# Patient Record
Sex: FEMALE | URBAN - METROPOLITAN AREA
[De-identification: names, ages, dates, MRNs, and addresses within clinical notes are randomized per-mention and may not be internally consistent; named-entity substitution may affect disease eponyms.]

---

## 2023-09-19 ENCOUNTER — INPATIENT (INPATIENT)
Facility: HOSPITAL | Age: 23
LOS: 2 days | Discharge: HOME | DRG: 754 | End: 2023-09-22
Attending: PSYCHIATRY & NEUROLOGY | Admitting: PSYCHIATRY & NEUROLOGY
Payer: MEDICAID

## 2023-09-19 DIAGNOSIS — Z91.51 PERSONAL HISTORY OF SUICIDAL BEHAVIOR: ICD-10-CM

## 2023-09-19 DIAGNOSIS — Z56.0 UNEMPLOYMENT, UNSPECIFIED: ICD-10-CM

## 2023-09-19 DIAGNOSIS — F32.9 MAJOR DEPRESSIVE DISORDER, SINGLE EPISODE, UNSPECIFIED: ICD-10-CM

## 2023-09-19 DIAGNOSIS — R45.851 SUICIDAL IDEATIONS: ICD-10-CM

## 2023-09-19 DIAGNOSIS — M54.50 LOW BACK PAIN, UNSPECIFIED: ICD-10-CM

## 2023-09-19 DIAGNOSIS — G89.21 CHRONIC PAIN DUE TO TRAUMA: ICD-10-CM

## 2023-09-19 PROCEDURE — 85025 COMPLETE CBC W/AUTO DIFF WBC: CPT

## 2023-09-19 PROCEDURE — 80053 COMPREHEN METABOLIC PANEL: CPT

## 2023-09-19 PROCEDURE — 93005 ELECTROCARDIOGRAM TRACING: CPT

## 2023-09-19 PROCEDURE — 83036 HEMOGLOBIN GLYCOSYLATED A1C: CPT

## 2023-09-19 PROCEDURE — 81025 URINE PREGNANCY TEST: CPT

## 2023-09-19 PROCEDURE — 80061 LIPID PANEL: CPT

## 2023-09-19 PROCEDURE — 80307 DRUG TEST PRSMV CHEM ANLYZR: CPT

## 2023-09-19 PROCEDURE — 84443 ASSAY THYROID STIM HORMONE: CPT

## 2023-09-19 PROCEDURE — 86769 SARS-COV-2 COVID-19 ANTIBODY: CPT

## 2023-09-19 RX ORDER — INFLUENZA VIRUS VACCINE 15; 15; 15; 15 UG/.5ML; UG/.5ML; UG/.5ML; UG/.5ML
0.5 SUSPENSION INTRAMUSCULAR ONCE
Refills: 0 | Status: DISCONTINUED | OUTPATIENT
Start: 2023-09-19 | End: 2023-09-22

## 2023-09-19 RX ORDER — DIPHENHYDRAMINE HCL 50 MG
25 CAPSULE ORAL EVERY 6 HOURS
Refills: 0 | Status: DISCONTINUED | OUTPATIENT
Start: 2023-09-20 | End: 2023-09-20

## 2023-09-19 RX ORDER — ACETAMINOPHEN 500 MG
650 TABLET ORAL EVERY 6 HOURS
Refills: 0 | Status: DISCONTINUED | OUTPATIENT
Start: 2023-09-20 | End: 2023-09-22

## 2023-09-19 RX ORDER — TRAZODONE HCL 50 MG
50 TABLET ORAL AT BEDTIME
Refills: 0 | Status: DISCONTINUED | OUTPATIENT
Start: 2023-09-20 | End: 2023-09-20

## 2023-09-19 RX ORDER — HALOPERIDOL DECANOATE 100 MG/ML
5 INJECTION INTRAMUSCULAR EVERY 6 HOURS
Refills: 0 | Status: DISCONTINUED | OUTPATIENT
Start: 2023-09-20 | End: 2023-09-20

## 2023-09-19 SDOH — ECONOMIC STABILITY - INCOME SECURITY: UNEMPLOYMENT, UNSPECIFIED: Z56.0

## 2023-09-19 NOTE — BH PATIENT PROFILE - PATIENT'S GENDER IDENTITY
Peripheral Nerve Block Patient Preparation    Peripheral Nerve Block Details  Peripheral Nerve Block Type:  Paravertebral, Ultrasound Guided  Block Designation: Right  Needle Type:  Stimulating  Stimulating Needle:  Yes    Needle Gauge:  22 G  Needle Localization: Ultrasound Guidance     Local Anesthetic:  Ropivacaine  Local Anesthetic Concentration:  0.5%  Local Anesthetic Volume:  18mL  Epinephrine:  2.5 mcg/mL (1:400K)  Incremental Injection:  Yes  Catheter Used:  No    Peripheral Nerve Block Note  T7-T8-T9 levels     No complications               Female

## 2023-09-19 NOTE — BH PATIENT PROFILE - NS ED BHA SUICIDALITY PRESENT CURRENT PASSIVE IDEATION
Felicia    Labs done with endocrinology showed normal 24 hour urine cortisol levels.  This is reassuring.    Follow up as discussed in last clinic visit.    Please call endocrinology clinic (nurse line: [phone]) if questions.    Please send a letter with above lab/test results and above comments.    Thank you.    Elisa Malcolm  
Yes

## 2023-09-20 VITALS
HEIGHT: 61 IN | WEIGHT: 150.58 LBS | DIASTOLIC BLOOD PRESSURE: 62 MMHG | HEART RATE: 66 BPM | SYSTOLIC BLOOD PRESSURE: 114 MMHG | TEMPERATURE: 97 F

## 2023-09-20 LAB — HCG UR QL: NEGATIVE — SIGNIFICANT CHANGE UP

## 2023-09-20 PROCEDURE — 93010 ELECTROCARDIOGRAM REPORT: CPT

## 2023-09-20 PROCEDURE — 99252 IP/OBS CONSLTJ NEW/EST SF 35: CPT

## 2023-09-20 PROCEDURE — 99232 SBSQ HOSP IP/OBS MODERATE 35: CPT

## 2023-09-20 PROCEDURE — 93010 ELECTROCARDIOGRAM REPORT: CPT | Mod: 77

## 2023-09-20 RX ORDER — HYDROXYZINE HCL 10 MG
50 TABLET ORAL EVERY 6 HOURS
Refills: 0 | Status: DISCONTINUED | OUTPATIENT
Start: 2023-09-20 | End: 2023-09-22

## 2023-09-20 RX ORDER — HALOPERIDOL DECANOATE 100 MG/ML
2 INJECTION INTRAMUSCULAR EVERY 6 HOURS
Refills: 0 | Status: DISCONTINUED | OUTPATIENT
Start: 2023-09-20 | End: 2023-09-22

## 2023-09-20 RX ORDER — SALICYLIC ACID 0.5 %
1 CLEANSER (GRAM) TOPICAL
Refills: 0 | Status: DISCONTINUED | OUTPATIENT
Start: 2023-09-20 | End: 2023-09-22

## 2023-09-20 RX ORDER — SERTRALINE 25 MG/1
50 TABLET, FILM COATED ORAL DAILY
Refills: 0 | Status: DISCONTINUED | OUTPATIENT
Start: 2023-09-20 | End: 2023-09-21

## 2023-09-20 RX ORDER — IBUPROFEN 200 MG
400 TABLET ORAL EVERY 6 HOURS
Refills: 0 | Status: DISCONTINUED | OUTPATIENT
Start: 2023-09-20 | End: 2023-09-22

## 2023-09-20 RX ORDER — CLONAZEPAM 1 MG
1 TABLET ORAL EVERY 6 HOURS
Refills: 0 | Status: DISCONTINUED | OUTPATIENT
Start: 2023-09-20 | End: 2023-09-20

## 2023-09-20 RX ORDER — DIPHENHYDRAMINE HCL 50 MG
25 CAPSULE ORAL EVERY 6 HOURS
Refills: 0 | Status: DISCONTINUED | OUTPATIENT
Start: 2023-09-20 | End: 2023-09-22

## 2023-09-20 RX ADMIN — Medication 400 MILLIGRAM(S): at 12:46

## 2023-09-20 RX ADMIN — Medication 650 MILLIGRAM(S): at 19:39

## 2023-09-20 RX ADMIN — SERTRALINE 50 MILLIGRAM(S): 25 TABLET, FILM COATED ORAL at 11:19

## 2023-09-20 RX ADMIN — Medication 650 MILLIGRAM(S): at 20:30

## 2023-09-20 RX ADMIN — Medication 400 MILLIGRAM(S): at 11:19

## 2023-09-20 RX ADMIN — Medication 650 MILLIGRAM(S): at 06:07

## 2023-09-20 RX ADMIN — Medication 650 MILLIGRAM(S): at 06:38

## 2023-09-20 NOTE — CHART NOTE - NSCHARTNOTEFT_GEN_A_CORE
I interviewed patient at bedside with Congolese speaking staff member (and female nurse chaperon). Patient confirms that she is depressed and wants to be admitted into our facility

## 2023-09-20 NOTE — BH INPATIENT PSYCHIATRY ASSESSMENT NOTE - RISK ASSESSMENT
Risk factors: Loss of employment, injury, chronic pain  Protective factors: Able to contract for safety, List reasons fo living, Supportive partner, future oriented

## 2023-09-20 NOTE — BH INPATIENT PSYCHIATRY ASSESSMENT NOTE - CURRENT MEDICATION
MEDICATIONS  (STANDING):  influenza   Vaccine 0.5 milliLiter(s) IntraMuscular once  sertraline 50 milliGRAM(s) Oral daily    MEDICATIONS  (PRN):  acetaminophen     Tablet .. 650 milliGRAM(s) Oral every 6 hours PRN Moderate Pain (4 - 6)  diphenhydrAMINE 25 milliGRAM(s) Oral every 6 hours PRN Extrapyramidal prophylaxis  haloperidol     Tablet 5 milliGRAM(s) Oral every 6 hours PRN agitation  hydrOXYzine hydrochloride 50 milliGRAM(s) Oral every 6 hours PRN Anxiety and or insomnia  ibuprofen  Tablet. 400 milliGRAM(s) Oral every 6 hours PRN Mild Pain (1 - 3)  LORazepam     Tablet 2 milliGRAM(s) Oral every 6 hours PRN agitation with haldol  vitamin A &amp; D Ointment 1 Application(s) Topical two times a day PRN Chapped lips   MEDICATIONS  (STANDING):  influenza   Vaccine 0.5 milliLiter(s) IntraMuscular once  sertraline 50 milliGRAM(s) Oral daily    MEDICATIONS  (PRN):  acetaminophen     Tablet .. 650 milliGRAM(s) Oral every 6 hours PRN Moderate Pain (4 - 6)  diphenhydrAMINE 25 milliGRAM(s) Oral every 6 hours PRN EPS  haloperidol     Tablet 2 milliGRAM(s) Oral every 6 hours PRN Agitation  hydrOXYzine hydrochloride 50 milliGRAM(s) Oral every 6 hours PRN Anxiety and or insomnia  ibuprofen  Tablet. 400 milliGRAM(s) Oral every 6 hours PRN Mild Pain (1 - 3)  LORazepam     Tablet 1 milliGRAM(s) Oral every 6 hours PRN Aggression only  vitamin A &amp; D Ointment 1 Application(s) Topical two times a day PRN Chapped lips

## 2023-09-20 NOTE — BH INPATIENT PSYCHIATRY ASSESSMENT NOTE - DESCRIPTION
23 year old Syrian speaking female from [facility], domiciled with significant other, currently unemployed following a work injury 1 year ago.

## 2023-09-20 NOTE — BH INPATIENT PSYCHIATRY ASSESSMENT NOTE - NSBHMSERECMEM_PSY_A_CORE
No heavy lifting/straining/Do not make important decisions/Walking - Indoors allowed/Do not drive or operate machinery/Showering allowed/Walking - Outdoors allowed/Stairs allowed
Normal

## 2023-09-20 NOTE — CONSULT NOTE ADULT - SUBJECTIVE AND OBJECTIVE BOX
HOSPITALIST CONSULT for IPP History and Physical     CORBIN RIVERA  23y, Female  Allergy: No Known Allergies    CHIEF COMPLAINT:     HPI:    HPI:    FAMILY HISTORY:    PAST MEDICAL & SURGICAL HISTORY:  No pertinent past medical history      No significant past surgical history          SOCIAL HISTORY  Social History:      Home Medications:      ROS  General: Denies fevers, chills, nightsweats, weight loss  HEENT: Denies headache, rhinorrhea, sore throat, eye pain  CV: Denies CP, palpitations  PULM: Denies SOB, cough  GI: Denies abdominal pain, diarrhea  : Denies dysuria, hematuria  MSK: Denies arthralgias  SKIN: Denies rash   NEURO: Denies paresthesias, weakness  PSYCH: Denies depression    VITALS:  T(F): 96.9, Max: 96.9 (09-20-23 @ 00:15)  HR: 66  BP: 114/62  RR: --Vital Signs Last 24 Hrs  T(C): 36.1 (20 Sep 2023 00:15), Max: 36.1 (20 Sep 2023 00:15)  T(F): 96.9 (20 Sep 2023 00:15), Max: 96.9 (20 Sep 2023 00:15)  HR: 66 (20 Sep 2023 00:15) (66 - 66)  BP: 114/62 (20 Sep 2023 00:15) (114/62 - 114/62)  BP(mean): --  RR: --  SpO2: --        PHYSICAL EXAM:  Gen: NAD, resting in bed  HEENT: Normocephalic, atraumatic  Neck: supple, no lymphadenopathy  CV: Regular rate & regular rhythm  Lungs: CTABL no wheeze  Abdomen: Soft, NTND+ BS present  Ext: Warm, well perfused no CCE  Neuro: non focal, awake, CN II-XII intact   Skin: no rash, no erythema  Psych: no SI, HI, Hallucination     TESTS & MEASUREMENTS:    QRS axis to [] ° and NSR at a rate of [] BPM. There was no atrial enlargement. There was no ventricular hypertrophy. There were no ST-T changes and all intervals were normal.      INFECTIOUS DISEASES TESTING      RADIOLOGY & ADDITIONAL TESTS:  I have personally reviewed the last Chest xray  CXR      CT      CARDIOLOGY TESTING      MEDICATIONS  (STANDING):  influenza   Vaccine 0.5 milliLiter(s) IntraMuscular once    MEDICATIONS  (PRN):  acetaminophen     Tablet .. 650 milliGRAM(s) Oral every 6 hours PRN Moderate Pain (4 - 6)  diphenhydrAMINE 25 milliGRAM(s) Oral every 6 hours PRN Extrapyramidal prophylaxis  haloperidol     Tablet 5 milliGRAM(s) Oral every 6 hours PRN agitation  LORazepam     Tablet 2 milliGRAM(s) Oral every 6 hours PRN agitation with haldol  traZODone 50 milliGRAM(s) Oral at bedtime PRN insomnia      ANTIBIOTICS:      All available historical data has been reviewed    ASSESSMENT  23y F admitted with Major depressive disorder with single episode        PROBLEMS   HOSPITALIST CONSULT for IPP History and Physical     bedside staff ( Kinyarwanda speaking used as  as per patient)     JENNIFERGABI GARCIACORBIN  23y, Female  Allergy: No Known Allergies      CHIEF COMPLAINT: Major depressive disorder with single episode (20 Sep 2023 08:26)    HPI:  a 23 y.o. Kinyarwanda speaking female, hx of back pain and surgery, admitted to psych floor insetting of episode of depression, as per patient feels good except lower back pian, mood swings but denies homicidal or suicidal ideation, limited hx, c/o about skin rashes she often gets, currently without any rashes. denies any other pain, chest pain, sob, n/v/d/c.      FAMILY HISTORY:  PAST MEDICAL & SURGICAL HISTORY:  chronic lower back pain     past surgical history  hx of back surgery     SOCIAL HISTORY  Social History:  Home Medications:    ROS  General: Denies fevers, chills, night sweats, weight loss  HEENT: Denies headache, rhinorrhea, sore throat, eye pain  CV: Denies CP, palpitations  PULM: Denies SOB, cough  GI: Denies abdominal pain, diarrhea  : Denies dysuria, hematuria  MSK: Denies arthralgias  SKIN: Denies rash   NEURO: Denies paresthesias, weakness  PSYCH: Denies depression    VITALS:  T(F): 97.5, Max: 97.5 (09-20-23 @ 08:27)  HR: 70  BP: 116/57  RR: 18Vital Signs Last 24 Hrs  T(C): 36.4 (20 Sep 2023 08:27), Max: 36.4 (20 Sep 2023 08:27)  T(F): 97.5 (20 Sep 2023 08:27), Max: 97.5 (20 Sep 2023 08:27)  HR: 70 (20 Sep 2023 08:27) (66 - 70)  BP: 116/57 (20 Sep 2023 08:27) (114/62 - 116/57)  BP(mean): --  RR: 18 (20 Sep 2023 08:27) (18 - 18)  SpO2: --      PHYSICAL EXAM:  Gen: NAD, resting in bed  HEENT: Normocephalic, atraumatic  Neck: supple, no lymphadenopathy  CV: Regular rate & regular rhythm  Lungs: CTABL no wheeze  Abdomen: Soft, NTND+ BS present  Ext: Warm, well perfused no CCE  Neuro: non focal, awake,   Skin: no rash, no erythema  Psych: no SI, HI, Hallucination     TESTS & MEASUREMENTS:  No labs done     RADIOLOGY & ADDITIONAL TESTS:  NA    CARDIOLOGY TESTING    MEDICATIONS  (STANDING):  influenza   Vaccine 0.5 milliLiter(s) IntraMuscular once  sertraline 50 milliGRAM(s) Oral daily    MEDICATIONS  (PRN):  acetaminophen     Tablet .. 650 milliGRAM(s) Oral every 6 hours PRN Moderate Pain (4 - 6)  diphenhydrAMINE 25 milliGRAM(s) Oral every 6 hours PRN Extrapyramidal prophylaxis  haloperidol     Tablet 5 milliGRAM(s) Oral every 6 hours PRN agitation  ibuprofen  Tablet. 400 milliGRAM(s) Oral every 6 hours PRN Mild Pain (1 - 3)  LORazepam     Tablet 2 milliGRAM(s) Oral every 6 hours PRN agitation with haldol  traZODone 50 milliGRAM(s) Oral at bedtime PRN insomnia      ASSESSMENT  23y F with pmh of chronic back pain, s/p back surgery, admitted with Major depressive disorder with single episode.     PROBLEMS  Major depressive disorder with single episode.   chronic lower back pain      plan:  continue care as per psych team  Tylenol prn for pain       thank you for involving us in patient care, please contact us for any further help

## 2023-09-20 NOTE — BH INPATIENT PSYCHIATRY ASSESSMENT NOTE - NSBHCHARTREVIEWVS_PSY_A_CORE FT
Vital Signs Last 24 Hrs  T(C): 36.4 (09-20-23 @ 08:27), Max: 36.4 (09-20-23 @ 08:27)  T(F): 97.5 (09-20-23 @ 08:27), Max: 97.5 (09-20-23 @ 08:27)  HR: 70 (09-20-23 @ 08:27) (66 - 70)  BP: 116/57 (09-20-23 @ 08:27) (114/62 - 116/57)  BP(mean): --  RR: 18 (09-20-23 @ 08:27) (18 - 18)  SpO2: --

## 2023-09-20 NOTE — BH INPATIENT PSYCHIATRY ASSESSMENT NOTE - HPI (INCLUDE ILLNESS QUALITY, SEVERITY, DURATION, TIMING, CONTEXT, MODIFYING FACTORS, ASSOCIATED SIGNS AND SYMPTOMS)
23 year old Hungarian speaking female from [facility], domiciled with significant other, currently unemployed following a work injury 1 year ago, with PMH chronic back pain related to injury, no formal psych hx, no prior SA, who presents after ingesting 16 pills with some suicidal ideation. Transferred from [facility].

## 2023-09-20 NOTE — BH INPATIENT PSYCHIATRY ASSESSMENT NOTE - NSBHMETABOLIC_PSY_ALL_CORE_FT
BMI: BMI (kg/m2): 28.5 (09-20-23 @ 00:15)  HbA1c:   Glucose:   BP: 116/57 (09-20-23 @ 08:27) (114/62 - 116/57)  Lipid Panel:

## 2023-09-20 NOTE — BH INPATIENT PSYCHIATRY ASSESSMENT NOTE - NSBHASSESSSUMMFT_PSY_ALL_CORE
#Admit    #MDD  -Zoloft mg daily    -Hydroxyzine 50mg Q6 PRN for anxiety or insomnia  -Haldol 2mg Q6 PRN for agitation or psychosis  -Benadryl 25mg Q6 PRN for EPS  -Lorazepam 1mg Q6 PRN for aggression    #Agitation  -for agitation not amenable to verbal redirection, may give haldol 5 mg q6h prn, ativan 2 mg q6h prn, benadryl 50 mg q6h prn with escalation to IM if pt is a danger to self or/and others with repeat EKG to ensure QTc <500 ms.   23 year old Turkmen speaking female from [facility], domiciled with significant other, currently unemployed following a work injury 1 year ago, with PMH chronic back pain related to injury, no formal psych hx, no prior SA, who presents after ingesting 16 pills with some suicidal ideation. Transferred from [facility].    Patient seen and evaluated on IPP with  Keturah ID #[id number]. Patient states that she has been feeling depressed and overwhelmed. Verbalized feeling down about loosing he job, getting hurt and being in pain, not being able to do what she used to do, difficulty sleeping and change in appetite. Patient denies suicidal ideations at this time, no intent or plan. States she is happy to be alive and feels that she really did not want to kill herself. knows she took too many pain pills, that she had left since last prescription and will not do it again. It appears patient may be minimizing the SA. Does admit to depression and states she wanted to see a psychiatrist but has no insurance. Agreeable to starting an antidepressant. Risks and benefits reviewed. Patient denies and A/V hallucinations. No evidence of psychosis noted. Denies any ETOH or Illicit drug use. Last menstrual period last month.     Collateral obtained from patient partner by PA student. See note.    Writer placed call to patients pharmacy [facility] [phone]. Per pharmacy Cyclobenzaprine and Meloxicam 30 day supply back in May      #Admit    #MDD  -Zoloft mg daily    -Hydroxyzine 50mg Q6 PRN for anxiety or insomnia  -Haldol 2mg Q6 PRN for agitation or psychosis  -Benadryl 25mg Q6 PRN for EPS  -Lorazepam 1mg Q6 PRN for aggression    -Tylenol PRN  -Motrin PRN  -Pain management consult    #Agitation  -for agitation not amenable to verbal redirection, may give haldol 5 mg q6h prn, ativan 2 mg q6h prn, benadryl 50 mg q6h prn with escalation to IM if pt is a danger to self or/and others with repeat EKG to ensure QTc <500 ms.

## 2023-09-21 LAB
A1C WITH ESTIMATED AVERAGE GLUCOSE RESULT: 5.3 % — SIGNIFICANT CHANGE UP (ref 4–5.6)
ALBUMIN SERPL ELPH-MCNC: 4.7 G/DL — SIGNIFICANT CHANGE UP (ref 3.5–5.2)
ALP SERPL-CCNC: 66 U/L — SIGNIFICANT CHANGE UP (ref 30–115)
ALT FLD-CCNC: 19 U/L — SIGNIFICANT CHANGE UP (ref 0–41)
ANION GAP SERPL CALC-SCNC: 9 MMOL/L — SIGNIFICANT CHANGE UP (ref 7–14)
AST SERPL-CCNC: 17 U/L — SIGNIFICANT CHANGE UP (ref 0–41)
BASOPHILS # BLD AUTO: 0.03 K/UL — SIGNIFICANT CHANGE UP (ref 0–0.2)
BASOPHILS NFR BLD AUTO: 0.4 % — SIGNIFICANT CHANGE UP (ref 0–1)
BILIRUB SERPL-MCNC: 0.6 MG/DL — SIGNIFICANT CHANGE UP (ref 0.2–1.2)
BUN SERPL-MCNC: 12 MG/DL — SIGNIFICANT CHANGE UP (ref 10–20)
CALCIUM SERPL-MCNC: 9.2 MG/DL — SIGNIFICANT CHANGE UP (ref 8.4–10.5)
CHLORIDE SERPL-SCNC: 103 MMOL/L — SIGNIFICANT CHANGE UP (ref 98–110)
CHOLEST SERPL-MCNC: 185 MG/DL — SIGNIFICANT CHANGE UP
CO2 SERPL-SCNC: 26 MMOL/L — SIGNIFICANT CHANGE UP (ref 17–32)
CREAT SERPL-MCNC: 0.8 MG/DL — SIGNIFICANT CHANGE UP (ref 0.7–1.5)
EGFR: 105 ML/MIN/1.73M2 — SIGNIFICANT CHANGE UP
EOSINOPHIL # BLD AUTO: 0.07 K/UL — SIGNIFICANT CHANGE UP (ref 0–0.7)
EOSINOPHIL NFR BLD AUTO: 0.9 % — SIGNIFICANT CHANGE UP (ref 0–8)
ESTIMATED AVERAGE GLUCOSE: 105 MG/DL — SIGNIFICANT CHANGE UP (ref 68–114)
GLUCOSE SERPL-MCNC: 97 MG/DL — SIGNIFICANT CHANGE UP (ref 70–99)
HCT VFR BLD CALC: 40.3 % — SIGNIFICANT CHANGE UP (ref 37–47)
HDLC SERPL-MCNC: 39 MG/DL — LOW
HGB BLD-MCNC: 13.3 G/DL — SIGNIFICANT CHANGE UP (ref 12–16)
IMM GRANULOCYTES NFR BLD AUTO: 0.2 % — SIGNIFICANT CHANGE UP (ref 0.1–0.3)
LIPID PNL WITH DIRECT LDL SERPL: 115 MG/DL — HIGH
LYMPHOCYTES # BLD AUTO: 1.8 K/UL — SIGNIFICANT CHANGE UP (ref 1.2–3.4)
LYMPHOCYTES # BLD AUTO: 22.2 % — SIGNIFICANT CHANGE UP (ref 20.5–51.1)
MCHC RBC-ENTMCNC: 28.7 PG — SIGNIFICANT CHANGE UP (ref 27–31)
MCHC RBC-ENTMCNC: 33 G/DL — SIGNIFICANT CHANGE UP (ref 32–37)
MCV RBC AUTO: 86.9 FL — SIGNIFICANT CHANGE UP (ref 81–99)
MONOCYTES # BLD AUTO: 0.41 K/UL — SIGNIFICANT CHANGE UP (ref 0.1–0.6)
MONOCYTES NFR BLD AUTO: 5.1 % — SIGNIFICANT CHANGE UP (ref 1.7–9.3)
NEUTROPHILS # BLD AUTO: 5.76 K/UL — SIGNIFICANT CHANGE UP (ref 1.4–6.5)
NEUTROPHILS NFR BLD AUTO: 71.2 % — SIGNIFICANT CHANGE UP (ref 42.2–75.2)
NON HDL CHOLESTEROL: 146 MG/DL — HIGH
NRBC # BLD: 0 /100 WBCS — SIGNIFICANT CHANGE UP (ref 0–0)
PLATELET # BLD AUTO: 350 K/UL — SIGNIFICANT CHANGE UP (ref 130–400)
PMV BLD: 9.6 FL — SIGNIFICANT CHANGE UP (ref 7.4–10.4)
POTASSIUM SERPL-MCNC: 4.4 MMOL/L — SIGNIFICANT CHANGE UP (ref 3.5–5)
POTASSIUM SERPL-SCNC: 4.4 MMOL/L — SIGNIFICANT CHANGE UP (ref 3.5–5)
PROT SERPL-MCNC: 8 G/DL — SIGNIFICANT CHANGE UP (ref 6–8)
RBC # BLD: 4.64 M/UL — SIGNIFICANT CHANGE UP (ref 4.2–5.4)
RBC # FLD: 12.1 % — SIGNIFICANT CHANGE UP (ref 11.5–14.5)
SODIUM SERPL-SCNC: 138 MMOL/L — SIGNIFICANT CHANGE UP (ref 135–146)
TRIGL SERPL-MCNC: 156 MG/DL — HIGH
TSH SERPL-MCNC: 1.78 UIU/ML — SIGNIFICANT CHANGE UP (ref 0.27–4.2)
WBC # BLD: 8.09 K/UL — SIGNIFICANT CHANGE UP (ref 4.8–10.8)
WBC # FLD AUTO: 8.09 K/UL — SIGNIFICANT CHANGE UP (ref 4.8–10.8)

## 2023-09-21 PROCEDURE — 99232 SBSQ HOSP IP/OBS MODERATE 35: CPT

## 2023-09-21 RX ORDER — SERTRALINE 25 MG/1
50 TABLET, FILM COATED ORAL AT BEDTIME
Refills: 0 | Status: COMPLETED | OUTPATIENT
Start: 2023-09-21 | End: 2023-09-21

## 2023-09-21 RX ORDER — SERTRALINE 25 MG/1
100 TABLET, FILM COATED ORAL AT BEDTIME
Refills: 0 | Status: DISCONTINUED | OUTPATIENT
Start: 2023-09-22 | End: 2023-09-22

## 2023-09-21 RX ORDER — SERTRALINE 25 MG/1
1 TABLET, FILM COATED ORAL
Qty: 30 | Refills: 0
Start: 2023-09-21 | End: 2023-10-20

## 2023-09-21 RX ADMIN — Medication 400 MILLIGRAM(S): at 12:43

## 2023-09-21 RX ADMIN — SERTRALINE 50 MILLIGRAM(S): 25 TABLET, FILM COATED ORAL at 08:19

## 2023-09-21 RX ADMIN — Medication 400 MILLIGRAM(S): at 13:40

## 2023-09-21 RX ADMIN — SERTRALINE 50 MILLIGRAM(S): 25 TABLET, FILM COATED ORAL at 20:02

## 2023-09-21 NOTE — BH INPATIENT PSYCHIATRY DISCHARGE NOTE - HPI (INCLUDE ILLNESS QUALITY, SEVERITY, DURATION, TIMING, CONTEXT, MODIFYING FACTORS, ASSOCIATED SIGNS AND SYMPTOMS)
23 year old Swedish speaking female from [facility], domiciled with significant other, currently unemployed following a work injury 1 year ago, with PMH chronic back pain related to injury, no formal psych hx, no prior SA, who presents after ingesting 16 pills with some suicidal ideation. Transferred from [facility].    Patient seen and evaluated 9/21/23. As per nursing report no acute events. Patient Guinean speaking only. Seen with attending psychiatrist Dr. Murphy who speaks Guinean. Patient appears remorseful for what she did. Patient expresses being overwhelmed with multiple psychosocial stressors and unsure how to cope. Patient wanted to see a psychiatrist but does not have insurance after her loosing her job after her workplace injury. Patient expresses a better mood. Patient appears future oriented and optimistic about being set up with outpatient treatment and request discharge. Patient is a voluntary admission. Patient started on Zoloft yesterday. Request to take at night because it makes her drowsy. Team discussed continued titration of Zoloft may be needed and patient needs to follow up with out patient. Patient in agreement. DBT handout written in Swedish provided to patient to assist with coping skills. Patient denies suicidal/ homicidal ideations. Able to contract for safety. Anticipated discharge tomorrow 9/22/23. Compliant with medication. Does not warrant continued Inpatient hospitalization. Patient does not appear to be of risk to self or others at this time. Patient also unable to benefit from groups as unit does not have any Guinean speaking groups at this time.    Patient states she has a pain management doctor and will schedule a visit when discharged. Patient has been alternating with Tylenol and Motrin on the unit and has been managing. States she did not need the pain meds all the time at home. Only took them when needed and that how she had some left.    Patient boyfriend has no concerns with discharge and will pick patient up.

## 2023-09-21 NOTE — BH CHART NOTE - NSEVENTNOTEFT_PSY_ALL_CORE
Contacted patient's boyfriend Vinny, [phone] ( [place], Rita, [id number]), to update him on discharge plans for tomorrow. Patient's boyfriend is in agreement with discharge plans. Boyfriend doesn't believe patient is a danger to herself or others. Boyfriend will pick patient up tomorrow at 11 A.M.
Collateral obtained from patient's boyfriend Vinny, [phone] ( [place], [place], [id number]), regarding patient's condition prior to admission. Boyfriend is aware patient had back surgery in April and was on pain medication for back, however claims he hasn't noticed patient express any depressive or suicidal symptoms. States patient has been completely normal, exhibiting appropriate mood and behavior. Boyfriend does not believe patient is a danger to herself or others. Boyfriend denies patient using/abusing any substances. Boyfriend denies patient exhibiting any auditory or visual hallucinations. Boyfriend updated on POC.

## 2023-09-21 NOTE — BH INPATIENT PSYCHIATRY DISCHARGE NOTE - NSBHMETABOLIC_PSY_ALL_CORE_FT
BMI: BMI (kg/m2): 28.5 (09-20-23 @ 00:15)  HbA1c:   Glucose:   BP: 127/65 (09-21-23 @ 09:58) (114/62 - 127/65)  Lipid Panel: Date/Time: 09-21-23 @ 08:50  Cholesterol, Serum: 185  Direct LDL: --  HDL Cholesterol, Serum: 39  Total Cholesterol/HDL Ration Measurement: --  Triglycerides, Serum: 156

## 2023-09-21 NOTE — BH INPATIENT PSYCHIATRY DISCHARGE NOTE - NSBHASSESSSUMMFT_PSY_ALL_CORE
23 year old Bengali speaking female from [facility], domiciled with significant other, currently unemployed following a work injury 1 year ago, with PMH chronic back pain related to injury, no formal psych hx, no prior SA, who presents after ingesting 16 pills with some suicidal ideation. Transferred from [facility].    Patient seen and evaluated. As per nursing report no acute events. Patient Tristanian speaking only. Seen with attending psychiatrist Dr. Murphy who speaks Tristanian. Patient appears remorseful for what she did. Patient expresses being overwhelmed with multiple psychosocial stressors and unsure how to cope. Patient wanted to see a psychiatrist but does not have insurance after her loosing her job after her workplace injury. Patient expresses a better mood. Patient appears future oriented and optimistic about being set up with outpatient treatment and request discharge. Patient is a voluntary admission. Patient started on Zoloft yesterday. Request to take at night because it makes her drowsy. Team discussed continued titration of Zoloft may be needed and patient needs to follow up with out patient. Patient in agreement. DBT handout written in Bengali provided to patient to assist with coping skills. Patient denies suicidal/ homicidal ideations. Able to contract for safety. Anticipated discharge tomorrow 9/22/23. Compliant with medication. Does not warrant continued Inpatient hospitalization. Patient does not appear to be of risk to self or others at this time. Patient also unable to benefit from groups as unit does not have any Tristanian speaking groups at this time.    Patient states she has a pain management doctor and will schedule a visit when discharged. Patient has been alternating with Tylenol and Motrin on the unit and has been managing. States she did not need the pain meds all the time at home. Only took them when needed and that how she had some left.    Patient boyfriend has no concerns with discharge and will pick patient up.    #Admit    #MDD  -Zoloft 100 mg bedtime    -Hydroxyzine 50mg Q6 PRN for anxiety or insomnia  -Haldol 2mg Q6 PRN for agitation or psychosis  -Benadryl 25mg Q6 PRN for EPS  -Lorazepam 1mg Q6 PRN for aggression    -Tylenol PRN  -Motrin PRN  -Pain management consult    #Agitation  -for agitation not amenable to verbal redirection, may give haldol 5 mg q6h prn, ativan 2 mg q6h prn, benadryl 50 mg q6h prn with escalation to IM if pt is a danger to self or/and others with repeat EKG to ensure QTc <500 ms.

## 2023-09-21 NOTE — BH INPATIENT PSYCHIATRY DISCHARGE NOTE - HOSPITAL COURSE
23 year old Latvian speaking female from [facility], domiciled with significant other, currently unemployed following a work injury 1 year ago, with PMH chronic back pain related to injury, no formal psych hx, no prior SA, who presents after ingesting 16 pills with some suicidal ideation. Transferred from [facility].    Patient seen and evaluated on IPP with  Keturah ID #[id number]. Patient states that she has been feeling depressed and overwhelmed. Verbalized feeling down about loosing he job, getting hurt and being in pain, not being able to do what she used to do, difficulty sleeping and change in appetite. Patient denies suicidal ideations at this time, no intent or plan. States she is happy to be alive and feels that she really did not want to kill herself. knows she took too many pain pills, that she had left since last prescription and will not do it again. It appears patient may be minimizing the SA. Does admit to depression and states she wanted to see a psychiatrist but has no insurance. Agreeable to starting an antidepressant. Risks and benefits reviewed. Patient denies and A/V hallucinations. No evidence of psychosis noted. Denies any ETOH or Illicit drug use. Last menstrual period last month.    Writer placed call to patients pharmacy [facility] [phone]. Per pharmacy Cyclobenzaprine and Meloxicam 30 day supply back in May      Patient seen and evaluated 9/21/23. As per nursing report no acute events. Patient Sri Lankan speaking only. Seen with attending psychiatrist Dr. Murphy who speaks Sri Lankan. Patient appears remorseful for what she did. Patient expresses being overwhelmed with multiple psychosocial stressors and unsure how to cope. Patient wanted to see a psychiatrist but does not have insurance after her loosing her job after her workplace injury. Patient expresses a better mood. Patient appears future oriented and optimistic about being set up with outpatient treatment and request discharge. Patient is a voluntary admission. Patient started on Zoloft yesterday. Request to take at night because it makes her drowsy. Team discussed continued titration of Zoloft may be needed and patient needs to follow up with out patient. Patient in agreement. DBT handout written in Latvian provided to patient to assist with coping skills. Patient denies suicidal/ homicidal ideations. Able to contract for safety. Anticipated discharge tomorrow 9/22/23. Compliant with medication. Does not warrant continued Inpatient hospitalization. Patient does not appear to be of risk to self or others at this time. Patient also unable to benefit from groups as unit does not have any Sri Lankan speaking groups at this time.    Patient states she has a pain management doctor and will schedule a visit when discharged. Patient has been alternating with Tylenol and Motrin on the unit and has been managing. States she did not need the pain meds all the time at home. Only took them when needed and that how she had some left.    Patient boyfriend has no concerns with discharge and will pick patient up.

## 2023-09-21 NOTE — BH INPATIENT PSYCHIATRY DISCHARGE NOTE - NSBHFUPINTERVALHXFT_PSY_A_CORE
Patient seen and evaluated 9/21/23. As per nursing report no acute events. Patient Polish speaking only. Seen with attending psychiatrist Dr. Murphy who speaks Polish. Patient appears remorseful for what she did. Patient expresses being overwhelmed with multiple psychosocial stressors and unsure how to cope. Patient wanted to see a psychiatrist but does not have insurance after her loosing her job after her workplace injury. Patient expresses a better mood. Patient appears future oriented and optimistic about being set up with outpatient treatment and request discharge. Patient is a voluntary admission. Patient started on Zoloft yesterday. Request to take at night because it makes her drowsy. Team discussed continued titration of Zoloft may be needed and patient needs to follow up with out patient. Patient in agreement. DBT handout written in Indonesian provided to patient to assist with coping skills. Patient denies suicidal/ homicidal ideations. Able to contract for safety. Anticipated discharge tomorrow 9/22/23. Compliant with medication. Does not warrant continued Inpatient hospitalization. Patient does not appear to be of risk to self or others at this time. Patient also unable to benefit from groups as unit does not have any Polish speaking groups at this time.    Patient states she has a pain management doctor and will schedule a visit when discharged. Patient has been alternating with Tylenol and Motrin on the unit and has been managing. States she did not need the pain meds all the time at home. Only took them when needed and that how she had some left.    Patient boyfriend has no concerns with discharge and will pick patient up.

## 2023-09-21 NOTE — BH INPATIENT PSYCHIATRY DISCHARGE NOTE - DESCRIPTION
23 year old Belgian speaking female from [facility], domiciled with significant other, currently unemployed following a work injury 1 year ago.

## 2023-09-21 NOTE — BH INPATIENT PSYCHIATRY DISCHARGE NOTE - NSDCMRMEDTOKEN_GEN_ALL_CORE_FT
sertraline 100 mg oral tablet: 1 tab(s) orally once a day (at bedtime) x 30 days Continue until told otherwise by your provider.

## 2023-09-21 NOTE — BH INPATIENT PSYCHIATRY DISCHARGE NOTE - NSDCCPCAREPLAN_GEN_ALL_CORE_FT
PRINCIPAL DISCHARGE DIAGNOSIS  Diagnosis: Major depressive disorder, single episode  Assessment and Plan of Treatment: Patient to continue with prescribed medication and follow up with outpatient

## 2023-09-21 NOTE — BH INPATIENT PSYCHIATRY PROGRESS NOTE - NSBHASSESSSUMMFT_PSY_ALL_CORE
23 year old Kinyarwanda speaking female from [facility], domiciled with significant other, currently unemployed following a work injury 1 year ago, with PMH chronic back pain related to injury, no formal psych hx, no prior SA, who presents after ingesting 16 pills with some suicidal ideation. Transferred from [facility].    Patient seen and evaluated. As per nursing report no acute events. Patient Malagasy speaking only. Seen with attending psychiatrist Dr. Murphy who speaks Malagasy. Patient appears remorseful for what she did. Patient expresses being overwhelmed with multiple psychosocial stressors and unsure how to cope. Patient wanted to see a psychiatrist but does not have insurance after her loosing her job after her workplace injury. Patient expresses a better mood. Patient appears future oriented and optimistic about being set up with outpatient treatment and request discharge. Patient is a voluntary admission. Patient started on Zoloft yesterday. Request to take at night because it makes her drowsy. Team discussed continued titration of Zoloft may be needed and patient needs to follow up with out patient. Patient in agreement. DBT handout written in Kinyarwanda provided to patient to assist with coping skills. Patient denies suicidal/ homicidal ideations. Able to contract for safety. Anticipated discharge tomorrow 9/22/23. Compliant with medication. Does not warrant continued Inpatient hospitalization. Patient does not appear to be of risk to self or others at this time. Patient also unable to benefit from groups as unit does not have any Malagasy speaking groups at this time.    Patient states she has a pain management doctor and will schedule a visit when discharged. Patient has been alternating with Tylenol and Motrin on the unit and has been managing. States she did not need the pain meds all the time at home. Only took them when needed and that how she had some left.    Patient boyfriend has no concerns with discharge and will pick patient up.    #Admit    #MDD  -Zoloft 100 mg bedtime    -Hydroxyzine 50mg Q6 PRN for anxiety or insomnia  -Haldol 2mg Q6 PRN for agitation or psychosis  -Benadryl 25mg Q6 PRN for EPS  -Lorazepam 1mg Q6 PRN for aggression    -Tylenol PRN  -Motrin PRN  -Pain management consult    #Agitation  -for agitation not amenable to verbal redirection, may give haldol 5 mg q6h prn, ativan 2 mg q6h prn, benadryl 50 mg q6h prn with escalation to IM if pt is a danger to self or/and others with repeat EKG to ensure QTc <500 ms.

## 2023-09-22 VITALS
TEMPERATURE: 98 F | RESPIRATION RATE: 16 BRPM | SYSTOLIC BLOOD PRESSURE: 113 MMHG | DIASTOLIC BLOOD PRESSURE: 69 MMHG | HEART RATE: 84 BPM

## 2023-09-22 LAB
COVID-19 SPIKE DOMAIN AB INTERP: POSITIVE
COVID-19 SPIKE DOMAIN ANTIBODY RESULT: >250 U/ML — HIGH
SARS-COV-2 IGG+IGM SERPL QL IA: >250 U/ML — HIGH
SARS-COV-2 IGG+IGM SERPL QL IA: POSITIVE

## 2023-09-22 PROCEDURE — 99238 HOSP IP/OBS DSCHRG MGMT 30/<: CPT

## 2023-09-22 NOTE — BH INPATIENT PSYCHIATRY PROGRESS NOTE - CURRENT MEDICATION
MEDICATIONS  (STANDING):  influenza   Vaccine 0.5 milliLiter(s) IntraMuscular once  sertraline 50 milliGRAM(s) Oral at bedtime    MEDICATIONS  (PRN):  acetaminophen     Tablet .. 650 milliGRAM(s) Oral every 6 hours PRN Moderate Pain (4 - 6)  diphenhydrAMINE 25 milliGRAM(s) Oral every 6 hours PRN EPS  haloperidol     Tablet 2 milliGRAM(s) Oral every 6 hours PRN Agitation  hydrOXYzine hydrochloride 50 milliGRAM(s) Oral every 6 hours PRN Anxiety and or insomnia  ibuprofen  Tablet. 400 milliGRAM(s) Oral every 6 hours PRN Mild Pain (1 - 3)  LORazepam     Tablet 1 milliGRAM(s) Oral every 6 hours PRN Aggression only  vitamin A &amp; D Ointment 1 Application(s) Topical two times a day PRN Chapped lips  
MEDICATIONS  (STANDING):  influenza   Vaccine 0.5 milliLiter(s) IntraMuscular once  sertraline 100 milliGRAM(s) Oral at bedtime    MEDICATIONS  (PRN):  acetaminophen     Tablet .. 650 milliGRAM(s) Oral every 6 hours PRN Moderate Pain (4 - 6)  diphenhydrAMINE 25 milliGRAM(s) Oral every 6 hours PRN EPS  haloperidol     Tablet 2 milliGRAM(s) Oral every 6 hours PRN Agitation  hydrOXYzine hydrochloride 50 milliGRAM(s) Oral every 6 hours PRN Anxiety and or insomnia  ibuprofen  Tablet. 400 milliGRAM(s) Oral every 6 hours PRN Mild Pain (1 - 3)  LORazepam     Tablet 1 milliGRAM(s) Oral every 6 hours PRN Aggression only  vitamin A &amp; D Ointment 1 Application(s) Topical two times a day PRN Chapped lips

## 2023-09-22 NOTE — BH INPATIENT PSYCHIATRY PROGRESS NOTE - NSBHCHARTREVIEWLAB_PSY_A_CORE FT
Complete Blood Count + Automated Diff in AM (09.21.23 @ 08:50)   WBC Count: 8.09 K/uL  RBC Count: 4.64 M/uL  Hemoglobin: 13.3 g/dL  Hematocrit: 40.3 %  Mean Cell Volume: 86.9 fL  Mean Cell Hemoglobin: 28.7 pg  Mean Cell Hemoglobin Conc: 33.0 g/dL  Red Cell Distrib Width: 12.1 %  Platelet Count - Automated: 350 K/uL  MPV: 9.6 fL  Auto Neutrophil #: 5.76 K/uL  Auto Lymphocyte #: 1.80 K/uL  Auto Monocyte #: 0.41 K/uL  Auto Eosinophil #: 0.07 K/uL  Auto Basophil #: 0.03 K/uL  Auto Neutrophil %: 71.2: Differential percentages must be correlated with absolute numbers for   clinical significance. %  Auto Lymphocyte %: 22.2 %  Auto Monocyte %: 5.1 %  Auto Eosinophil %: 0.9 %  Auto Basophil %: 0.4 %  Auto Immature Granulocyte %: 0.2: (Includes meta, myelo and promyelocytes). Mild elevations in immature   granulocytes may be seen with many inflammatory processes and pregnancy;   clinical correlation suggested. %  Nucleated RBC: 0 /100 WBCs
Complete Blood Count + Automated Diff in AM (09.21.23 @ 08:50)   WBC Count: 8.09 K/uL  RBC Count: 4.64 M/uL  Hemoglobin: 13.3 g/dL  Hematocrit: 40.3 %  Mean Cell Volume: 86.9 fL  Mean Cell Hemoglobin: 28.7 pg  Mean Cell Hemoglobin Conc: 33.0 g/dL  Red Cell Distrib Width: 12.1 %  Platelet Count - Automated: 350 K/uL  MPV: 9.6 fL  Auto Neutrophil #: 5.76 K/uL  Auto Lymphocyte #: 1.80 K/uL  Auto Monocyte #: 0.41 K/uL  Auto Eosinophil #: 0.07 K/uL  Auto Basophil #: 0.03 K/uL  Auto Neutrophil %: 71.2: Differential percentages must be correlated with absolute numbers for   clinical significance. %  Auto Lymphocyte %: 22.2 %  Auto Monocyte %: 5.1 %  Auto Eosinophil %: 0.9 %  Auto Basophil %: 0.4 %  Auto Immature Granulocyte %: 0.2: (Includes meta, myelo and promyelocytes). Mild elevations in immature   granulocytes may be seen with many inflammatory processes and pregnancy;   clinical correlation suggested. %  Nucleated RBC: 0 /100 WBCs

## 2023-09-22 NOTE — BH INPATIENT PSYCHIATRY PROGRESS NOTE - NSBHFUPINTERVALHXFT_PSY_A_CORE
D/C today. Patients boyfriend picking patient up. Meds sent to [facility] pharmacy as requested by patient. Patient provided with a Medicaid Victorino Card. Patient East Timorese speaking only. Seen with attending psychiatrist Dr. Murphy who speaks East Timorese. Patient appeared to be in good spirits today. Endorses a better mood. Insight and judgment have improved. Denies suicidal/ homicidal ideations. Denies any A/V hallucinations. No evidence of psychosis noted. Compliant with medication. Does not warrant continued Inpatient hospitalization. Writer, Attending psychiatrist, PA student and reviewed D/C papers with patient. Patient verbalized understanding. Patient does not appear to be of risk to self or others at this time.
Patient seen and evaluated. As per nursing report no acute events. Patient Greek speaking only. Seen with attending psychiatrist Dr. Murphy who speaks Greek. Patient appears remorseful for what she did. Patient expresses being overwhelmed with multiple psychosocial stressors and unsure how to cope. Patient wanted to see a psychiatrist but does not have insurance after her loosing her job after her workplace injury. Patient expresses a better mood. Patient appears future oriented and optimistic about being set up with outpatient treatment and request discharge. Patient is a voluntary admission. Patient started on Zoloft yesterday. Request to take at night because it makes her drowsy. Team discussed continued titration of Zoloft may be needed and patient needs to follow up with out patient. Patient in agreement. DBT handout written in Cambodian provided to patient to assist with coping skills. Patient denies suicidal/ homicidal ideations. Able to contract for safety. Anticipated discharge tomorrow 9/22/23. Compliant with medication. Does not warrant continued Inpatient hospitalization. Patient does not appear to be of risk to self or others at this time. Patient also unable to benefit from groups as unit does not have any Greek speaking groups at this time.    Patient states she has a pain management doctor and will schedule a visit when discharged. Patient has been alternating with Tylenol and Motrin on the unit and has been managing. States she did not need the pain meds all the time at home. Only took them when needed and that how she had some left.    Patient boyfriend has no concerns with discharge and will pick patient up.

## 2023-09-22 NOTE — BH DISCHARGE NOTE NURSING/SOCIAL WORK/PSYCH REHAB - PATIENT PORTAL LINK FT
You can access the FollowMyHealth Patient Portal offered by [facility] by registering at the following website: [email or web address]. By joining [employer]’s FollowMyHealth portal, you will also be able to view your health information using other applications (apps) compatible with our system.

## 2023-09-22 NOTE — BH INPATIENT PSYCHIATRY PROGRESS NOTE - NSBHMETABOLIC_PSY_ALL_CORE_FT
BMI: BMI (kg/m2): 28.5 (09-20-23 @ 00:15)  HbA1c:   Glucose:   BP: 127/65 (09-21-23 @ 09:58) (114/62 - 127/65)  Lipid Panel: Date/Time: 09-21-23 @ 08:50  Cholesterol, Serum: 185  Direct LDL: --  HDL Cholesterol, Serum: 39  Total Cholesterol/HDL Ration Measurement: --  Triglycerides, Serum: 156  
BMI: BMI (kg/m2): 28.5 (09-20-23 @ 00:15)  HbA1c: A1C with Estimated Average Glucose Result: 5.3 % (09-21-23 @ 08:50)    Glucose:   BP: 113/69 (09-22-23 @ 08:18) (113/69 - 127/65)  Lipid Panel: Date/Time: 09-21-23 @ 08:50  Cholesterol, Serum: 185  Direct LDL: --  HDL Cholesterol, Serum: 39  Total Cholesterol/HDL Ration Measurement: --  Triglycerides, Serum: 156

## 2023-09-22 NOTE — BH INPATIENT PSYCHIATRY PROGRESS NOTE - NSICDXBHPRIMARYDX_PSY_ALL_CORE
Major depressive disorder, single episode   F32.9  
Major depressive disorder, single episode   F32.9

## 2023-09-22 NOTE — BH INPATIENT PSYCHIATRY PROGRESS NOTE - NSBHATTESTAPPBILLTIME_PSY_A_CORE
I attest my time as HENRY is greater than 50% of the total combined time spent on qualifying patient care activities. I have reviewed and verified the documentation.
I attest my time as HENRY is greater than 50% of the total combined time spent on qualifying patient care activities. I have reviewed and verified the documentation.

## 2023-09-22 NOTE — BH INPATIENT PSYCHIATRY PROGRESS NOTE - NSDCCRITERIA_PSY_ALL_CORE
To be discharged home to partner once deemed no longer a danger to self or others.
To be discharged home to partner once deemed no longer a danger to self or others.

## 2023-09-22 NOTE — BH DISCHARGE NOTE NURSING/SOCIAL WORK/PSYCH REHAB - NSCDUDCCRISIS_PSY_A_CORE
Critical [facility] hospital [facility]  1 (508) [facility] ([phone][facility]  Text "WELL" to 20960  Website: www.[employer].me.Watch Over Me/.National Suicide Prevention Lifeline 6 (412) 326-7301/.  [facility]  1 (733) LIFENET (111-7277)/988 Suicide and Crisis Lifeline

## 2023-09-22 NOTE — BH INPATIENT PSYCHIATRY PROGRESS NOTE - NSBHCHARTREVIEWINVESTIGATE_PSY_A_CORE FT
< from: 12 Lead ECG (09.20.23 @ 19:15) >    Ventricular Rate 79 BPM    Atrial Rate 79 BPM    P-R Interval 138 ms    QRS Duration 80 ms    Q-T Interval 368 ms    QTC Calculation(Bazett) 421 ms    P Axis 82 degrees    R Axis 75 degrees    T Axis 57 degrees    Diagnosis Line Normal sinus rhythm  Normal ECG    < end of copied text >    
< from: 12 Lead ECG (09.20.23 @ 19:15) >    Ventricular Rate 79 BPM    Atrial Rate 79 BPM    P-R Interval 138 ms    QRS Duration 80 ms    Q-T Interval 368 ms    QTC Calculation(Bazett) 421 ms    P Axis 82 degrees    R Axis 75 degrees    T Axis 57 degrees    Diagnosis Line Normal sinus rhythm  Normal ECG    < end of copied text >

## 2023-09-22 NOTE — BH TREATMENT PLAN - NSTXPLANTHERAPYSESSIONSFT_PSY_ALL_CORE
09-20-23  Type of therapy: Dialectical behavior therapy, Coping skills  Type of session: Group  Level of patient participation: Attentive, Participated with encouragement  Duration of participation: 45 minutes  Therapy conducted by: Social work  Therapy Summary: Patient attended the Crisis Skills Group with  and peers. The DBT Wise Mind ACCEPTS skill was taught (a Distress Tolerance skill that uses distraction to temporarily distance yourself from a distressing situation or emotion). The following skills were reviewed: “activities, contributing, comparisons, emotions, pushing away, thoughts, sensations”. Patients offered support and feedback while  facilitated the group.    Yanelis participated in the dialogue, with assistance from Romanian speaking social work intern. She appeared open to the therapeutic intervention. She feels she can use activities (watching Lary movies, baking) to help cope with stressors. She remained in good behavioral control.    09-20-23  Type of therapy: Psychoeducation, Self esteem  Type of session: Group  Level of patient participation: Attentive, Engaged, Participates  Duration of participation: 45 minutes  Therapy conducted by: Social work  Therapy Summary: Patient attended the Social Work Group with  and peers. The topic of building a routine was discussed, as well as the (mental health) benefits of creating daily structure. Patients were given a sample routine worksheet to complete at their leisure. Patients offered support and feedback while  facilitated the discussion.      Yanelis participated (with assistance from social work intern) and appeared open to reviewing the materials presented. She agreed with the benefits of maintaining a healthy routine. She discussed her plan to follow up with her self care ritual, once discharged, and explore local ES classes. She remained in good behavioral control for the duration of the group.    09-22-23  Type of therapy: Coping skills, Creative arts therapy, Inspiration and motiviation, Leisure development, Self esteem, Social skills training, Stress management  Type of session: Individual  Level of patient participation: Quiet  Duration of participation: Less than 15 minutes  Therapy conducted by: Psych rehab  Therapy Summary: Pt will need increased encouragement , allow for self expression / awareness .

## 2023-09-22 NOTE — BH TREATMENT PLAN - NSTXPAININTERRN_PSY_ALL_CORE
RN will monitor patient and provide support and comfort and provide safety on unit.    RN to encourage medication compliance and provide support and education as needed on Dx, coping skills, medication, and safety planning.  RN to Encourage daily ADL's  RN to Encourage group attendance  RN will assess and intervene for complaints of pain

## 2023-09-22 NOTE — BH INPATIENT PSYCHIATRY PROGRESS NOTE - PRN MEDS
MEDICATIONS  (PRN):  acetaminophen     Tablet .. 650 milliGRAM(s) Oral every 6 hours PRN Moderate Pain (4 - 6)  diphenhydrAMINE 25 milliGRAM(s) Oral every 6 hours PRN EPS  haloperidol     Tablet 2 milliGRAM(s) Oral every 6 hours PRN Agitation  hydrOXYzine hydrochloride 50 milliGRAM(s) Oral every 6 hours PRN Anxiety and or insomnia  ibuprofen  Tablet. 400 milliGRAM(s) Oral every 6 hours PRN Mild Pain (1 - 3)  LORazepam     Tablet 1 milliGRAM(s) Oral every 6 hours PRN Aggression only  vitamin A &amp; D Ointment 1 Application(s) Topical two times a day PRN Chapped lips  
MEDICATIONS  (PRN):  acetaminophen     Tablet .. 650 milliGRAM(s) Oral every 6 hours PRN Moderate Pain (4 - 6)  diphenhydrAMINE 25 milliGRAM(s) Oral every 6 hours PRN EPS  haloperidol     Tablet 2 milliGRAM(s) Oral every 6 hours PRN Agitation  hydrOXYzine hydrochloride 50 milliGRAM(s) Oral every 6 hours PRN Anxiety and or insomnia  ibuprofen  Tablet. 400 milliGRAM(s) Oral every 6 hours PRN Mild Pain (1 - 3)  LORazepam     Tablet 1 milliGRAM(s) Oral every 6 hours PRN Aggression only  vitamin A &amp; D Ointment 1 Application(s) Topical two times a day PRN Chapped lips

## 2023-09-22 NOTE — BH INPATIENT PSYCHIATRY PROGRESS NOTE - NSBHASSESSSUMMFT_PSY_ALL_CORE
6 23 year old Armenian speaking female from [facility], domiciled with significant other, currently unemployed following a work injury 1 year ago, with PMH chronic back pain related to injury, no formal psych hx, no prior SA, who presents after ingesting 16 pills with some suicidal ideation. Transferred from [facility].    D/C today. Patients boyfriend picking patient up. Meds sent to [facility] pharmacy as requested by patient. Patient provided with a Medicaid Victor[place] Card. Patient Trinidadian speaking only. Seen with attending psychiatrist Dr. Murphy who speaks Trinidadian. Patient appeared to be in good spirits today. Endorses a better mood. Insight and judgment have improved. Denies suicidal/ homicidal ideations. Denies any A/V hallucinations. No evidence of psychosis noted. Compliant with medication. Does not warrant continued Inpatient hospitalization. Writer, Attending psychiatrist, PA student and reviewed D/C papers with patient. Patient verbalized understanding. Patient does not appear to be of risk to self or others at this time.    #Admit    #MDD  -Zoloft 100 mg bedtime    -Hydroxyzine 50mg Q6 PRN for anxiety or insomnia  -Haldol 2mg Q6 PRN for agitation or psychosis  -Benadryl 25mg Q6 PRN for EPS  -Lorazepam 1mg Q6 PRN for aggression    -Tylenol PRN  -Motrin PRN  -Pain management consult    #Agitation  -for agitation not amenable to verbal redirection, may give haldol 5 mg q6h prn, ativan 2 mg q6h prn, benadryl 50 mg q6h prn with escalation to IM if pt is a danger to self or/and others with repeat EKG to ensure QTc <500 ms.

## 2023-09-22 NOTE — BH INPATIENT PSYCHIATRY PROGRESS NOTE - NSBHCHARTREVIEWVS_PSY_A_CORE FT
Vital Signs Last 24 Hrs  T(C): 36.9 (09-22-23 @ 08:18), Max: 37.1 (09-21-23 @ 16:55)  T(F): 98.5 (09-22-23 @ 08:18), Max: 98.7 (09-21-23 @ 16:55)  HR: 84 (09-22-23 @ 08:18) (78 - 84)  BP: 113/69 (09-22-23 @ 08:18) (113/69 - 126/56)  BP(mean): --  RR: 16 (09-22-23 @ 08:18) (16 - 16)  SpO2: --    
Vital Signs Last 24 Hrs  T(C): 36.6 (09-21-23 @ 09:58), Max: 36.6 (09-21-23 @ 09:58)  T(F): 97.8 (09-21-23 @ 09:58), Max: 97.8 (09-21-23 @ 09:58)  HR: 93 (09-21-23 @ 09:58) (93 - 93)  BP: 127/65 (09-21-23 @ 09:58) (127/65 - 127/65)  BP(mean): --  RR: 18 (09-21-23 @ 09:58) (18 - 18)  SpO2: --

## 2023-09-25 LAB — DRUG SCREEN, SERUM: SIGNIFICANT CHANGE UP

## 2023-10-04 NOTE — BH SOCIAL WORK CONFIRMATION FOLLOW UP NOTE - NSCOMMENTS_PSY_ALL_CORE
Yanelis was discharged from [facility] on 9/22. She broke her outpatient mental health appointment at [facility] on 9/25, [phone].  sent a referral for a wellness check via [facility] crisis on 9/16 which was sent to [facility] unit [phone].  Yanelis was discharged from [facility] on 9/22. She broke her outpatient mental health appointment at [facility] on 9/25, [phone].  sent a referral for a wellness check via [facility] crisis on 9/26 which was sent to [facility] unit [phone].

## 2025-03-14 NOTE — BH INPATIENT PSYCHIATRY PROGRESS NOTE - NSTXSUICIDINTERMD_PSY_ALL_CORE
[facility] the [facility].  Injectafer completed. Patient tolerated well.   Any issues or concerns during appointment:  Patient with elevated BP during appointment.  Patient reports that its usually high in MD appointments and its normal when she gets home.  Patient instructed to recheck BP at home and call PCP if BP remains elevated at home.  Discharged ambulatory.    
Medication management and milieu therapy
Medication management and milieu therapy

## 2025-06-18 NOTE — BH INPATIENT PSYCHIATRY DISCHARGE NOTE - NSBHDCMEDICALFT_PSY_A_CORE
Chronic pain due to a workplace injury. Patient states she has a pain management doctor and will schedule a visit when discharged. Patient has been alternating with Tylenol and Motrin on the unit and has been managing. States she did not need the pain meds all the time at home. Only took them when needed.
Malik Enriquez